# Patient Record
Sex: FEMALE | Race: BLACK OR AFRICAN AMERICAN | NOT HISPANIC OR LATINO | Employment: FULL TIME | URBAN - METROPOLITAN AREA
[De-identification: names, ages, dates, MRNs, and addresses within clinical notes are randomized per-mention and may not be internally consistent; named-entity substitution may affect disease eponyms.]

---

## 2017-10-26 ENCOUNTER — NON-PROVIDER VISIT (OUTPATIENT)
Dept: OCCUPATIONAL MEDICINE | Facility: CLINIC | Age: 47
End: 2017-10-26

## 2017-10-26 DIAGNOSIS — Z02.89 ENCOUNTER FOR OCCUPATIONAL HEALTH EXAMINATION: ICD-10-CM

## 2017-10-26 DIAGNOSIS — Z02.1 PRE-EMPLOYMENT DRUG SCREENING: ICD-10-CM

## 2017-10-26 LAB
AMP AMPHETAMINE: NORMAL
BAR BARBITURATES: NORMAL
BZO BENZODIAZEPINES: NORMAL
COC COCAINE: NORMAL
INT CON NEG: NORMAL
INT CON POS: NORMAL
MDMA ECSTASY: NORMAL
MET METHAMPHETAMINES: NORMAL
MTD METHADONE: NORMAL
OPI OPIATES: NORMAL
OXY OXYCODONE: NORMAL
PCP PHENCYCLIDINE: NORMAL
POC URINE DRUG SCREEN OCDRS: NEGATIVE
THC: NORMAL

## 2017-10-26 PROCEDURE — 80305 DRUG TEST PRSMV DIR OPT OBS: CPT | Performed by: PREVENTIVE MEDICINE

## 2018-07-05 ENCOUNTER — APPOINTMENT (OUTPATIENT)
Dept: RADIOLOGY | Facility: MEDICAL CENTER | Age: 48
End: 2018-07-05
Attending: EMERGENCY MEDICINE
Payer: MEDICAID

## 2018-07-05 ENCOUNTER — HOSPITAL ENCOUNTER (EMERGENCY)
Facility: MEDICAL CENTER | Age: 48
End: 2018-07-05
Attending: EMERGENCY MEDICINE
Payer: MEDICAID

## 2018-07-05 VITALS
OXYGEN SATURATION: 100 % | DIASTOLIC BLOOD PRESSURE: 81 MMHG | HEIGHT: 62 IN | HEART RATE: 71 BPM | TEMPERATURE: 97.9 F | RESPIRATION RATE: 16 BRPM | SYSTOLIC BLOOD PRESSURE: 115 MMHG | WEIGHT: 169.53 LBS | BODY MASS INDEX: 31.2 KG/M2

## 2018-07-05 DIAGNOSIS — D50.9 IRON DEFICIENCY ANEMIA, UNSPECIFIED IRON DEFICIENCY ANEMIA TYPE: ICD-10-CM

## 2018-07-05 DIAGNOSIS — R10.32 LEFT LOWER QUADRANT PAIN: ICD-10-CM

## 2018-07-05 DIAGNOSIS — R51.9 ACUTE NONINTRACTABLE HEADACHE, UNSPECIFIED HEADACHE TYPE: ICD-10-CM

## 2018-07-05 LAB
ALBUMIN SERPL BCP-MCNC: 4.1 G/DL (ref 3.2–4.9)
ALBUMIN/GLOB SERPL: 1.1 G/DL
ALP SERPL-CCNC: 50 U/L (ref 30–99)
ALT SERPL-CCNC: 10 U/L (ref 2–50)
ANION GAP SERPL CALC-SCNC: 5 MMOL/L (ref 0–11.9)
ANISOCYTOSIS BLD QL SMEAR: ABNORMAL
AST SERPL-CCNC: 13 U/L (ref 12–45)
BASOPHILS # BLD AUTO: 3.5 % (ref 0–1.8)
BASOPHILS # BLD: 0.15 K/UL (ref 0–0.12)
BILIRUB SERPL-MCNC: 0.3 MG/DL (ref 0.1–1.5)
BUN SERPL-MCNC: 13 MG/DL (ref 8–22)
CALCIUM SERPL-MCNC: 9.3 MG/DL (ref 8.5–10.5)
CHLORIDE SERPL-SCNC: 111 MMOL/L (ref 96–112)
CO2 SERPL-SCNC: 23 MMOL/L (ref 20–33)
CREAT SERPL-MCNC: 0.65 MG/DL (ref 0.5–1.4)
EOSINOPHIL # BLD AUTO: 0.33 K/UL (ref 0–0.51)
EOSINOPHIL NFR BLD: 7.8 % (ref 0–6.9)
ERYTHROCYTE [DISTWIDTH] IN BLOOD BY AUTOMATED COUNT: 45.7 FL (ref 35.9–50)
GLOBULIN SER CALC-MCNC: 3.6 G/DL (ref 1.9–3.5)
GLUCOSE SERPL-MCNC: 94 MG/DL (ref 65–99)
HCG SERPL QL: NEGATIVE
HCT VFR BLD AUTO: 24.6 % (ref 37–47)
HGB BLD-MCNC: 7.1 G/DL (ref 12–16)
LIPASE SERPL-CCNC: 12 U/L (ref 11–82)
LYMPHOCYTES # BLD AUTO: 1.24 K/UL (ref 1–4.8)
LYMPHOCYTES NFR BLD: 29.6 % (ref 22–41)
MANUAL DIFF BLD: NORMAL
MCH RBC QN AUTO: 20.2 PG (ref 27–33)
MCHC RBC AUTO-ENTMCNC: 28.9 G/DL (ref 33.6–35)
MCV RBC AUTO: 70.1 FL (ref 81.4–97.8)
MICROCYTES BLD QL SMEAR: ABNORMAL
MONOCYTES # BLD AUTO: 0.15 K/UL (ref 0–0.85)
MONOCYTES NFR BLD AUTO: 3.5 % (ref 0–13.4)
MORPHOLOGY BLD-IMP: NORMAL
NEUTROPHILS # BLD AUTO: 2.34 K/UL (ref 2–7.15)
NEUTROPHILS NFR BLD: 55.6 % (ref 44–72)
NRBC # BLD AUTO: 0 K/UL
NRBC BLD-RTO: 0 /100 WBC
OVALOCYTES BLD QL SMEAR: NORMAL
PLATELET # BLD AUTO: 406 K/UL (ref 164–446)
PLATELET BLD QL SMEAR: NORMAL
PMV BLD AUTO: 9.6 FL (ref 9–12.9)
POIKILOCYTOSIS BLD QL SMEAR: NORMAL
POTASSIUM SERPL-SCNC: 4 MMOL/L (ref 3.6–5.5)
PROT SERPL-MCNC: 7.7 G/DL (ref 6–8.2)
RBC # BLD AUTO: 3.51 M/UL (ref 4.2–5.4)
RBC BLD AUTO: PRESENT
SODIUM SERPL-SCNC: 139 MMOL/L (ref 135–145)
WBC # BLD AUTO: 4.2 K/UL (ref 4.8–10.8)

## 2018-07-05 PROCEDURE — 700111 HCHG RX REV CODE 636 W/ 250 OVERRIDE (IP): Performed by: EMERGENCY MEDICINE

## 2018-07-05 PROCEDURE — 36415 COLL VENOUS BLD VENIPUNCTURE: CPT

## 2018-07-05 PROCEDURE — 84703 CHORIONIC GONADOTROPIN ASSAY: CPT

## 2018-07-05 PROCEDURE — 83690 ASSAY OF LIPASE: CPT

## 2018-07-05 PROCEDURE — 76857 US EXAM PELVIC LIMITED: CPT

## 2018-07-05 PROCEDURE — 80053 COMPREHEN METABOLIC PANEL: CPT

## 2018-07-05 PROCEDURE — 74018 RADEX ABDOMEN 1 VIEW: CPT

## 2018-07-05 PROCEDURE — 85007 BL SMEAR W/DIFF WBC COUNT: CPT

## 2018-07-05 PROCEDURE — 85027 COMPLETE CBC AUTOMATED: CPT

## 2018-07-05 PROCEDURE — 96374 THER/PROPH/DIAG INJ IV PUSH: CPT

## 2018-07-05 PROCEDURE — 99285 EMERGENCY DEPT VISIT HI MDM: CPT

## 2018-07-05 RX ORDER — KETOROLAC TROMETHAMINE 30 MG/ML
30 INJECTION, SOLUTION INTRAMUSCULAR; INTRAVENOUS ONCE
Status: COMPLETED | OUTPATIENT
Start: 2018-07-05 | End: 2018-07-05

## 2018-07-05 RX ADMIN — KETOROLAC TROMETHAMINE 30 MG: 30 INJECTION, SOLUTION INTRAMUSCULAR at 12:56

## 2018-07-05 ASSESSMENT — PAIN SCALES - GENERAL: PAINLEVEL_OUTOF10: 9

## 2018-07-05 NOTE — ED PROVIDER NOTES
ED Provider Note    Scribed for Camron Resendiz M.D. by Cassandra Zabala. 7/5/2018  12:17 PM    Primary care provider: Pcp Not In Computer  Means of arrival: Walk-in  History obtained from: Patient   History limited by: none    CHIEF COMPLAINT  Chief Complaint   Patient presents with   • Head Ache     x 6 days   • Cramping     lower abd pain , hx of ovarian cyst       HPI  Valencia Robin is a 48 y.o. female who presents to the Emergency Department for a headache that began six days ago. Her headache is located in her bilateral temples and has gradually worsened. She endorses nausea and mild constipation associated. Patient has been taking Advil with mild relief of her pain.  Patient denies history of migraines. She denies head trauma. Patient was seen at [facility] for abdominal pain on 06/28/2018 and diagnosed with simple left ovarian cyst with anemia. Patient was discharged with Ultram and Iron which she has been compliant with. She went to [facility] for abdominal pain which resoled but then returned last night. She denies modifying factors of her abdominal pain. She has a follow-up appointment in two days with Dr. Oscar (OB/GYN).         REVIEW OF SYSTEMS  Pertinent positives include abdominal pain, headache, nausea. Pertinent negatives include no dysuria, hematuria, vomiting, diarrhea, fever, neck stiffness.  All other systems reviewed and negative.  C.    PAST MEDICAL HISTORY   left Ovarian cyst    SURGICAL HISTORY   has a past surgical history that includes gyn surgery.    SOCIAL HISTORY  Social History   Substance Use Topics   • Smoking status: Current Every Day Smoker     Packs/day: 0.50     Types: Cigarettes   • Smokeless tobacco: Never Used   • Alcohol use No      History   Drug Use No       FAMILY HISTORY  History reviewed. No pertinent family history.    CURRENT MEDICATIONS  Home Medications     Reviewed by Rolanda Judge R.N. (Registered Nurse) on 07/05/18 at 1122  Med List Status:  "Partial   Medication Last Dose Status   hydrocodone-acetaminophen (NORCO) 5-325 MG Tab per tablet  Active   ibuprofen (MOTRIN) 200 MG Tab 11/23/2015 Active                ALLERGIES  No Known Allergies    PHYSICAL EXAM  VITAL SIGNS: /82   Pulse 88   Temp 36.6 °C (97.9 °F)   Resp 16   Ht 1.575 m (5' 2\")   Wt 76.9 kg (169 lb 8.5 oz)   SpO2 100%   BMI 31.01 kg/m²     Constitutional: Well developed, Well nourished, mild distress, Non-toxic appearance.   HENT: Normocephalic, Atraumatic, Bilateral external ears normal, Oropharynx moist, No oral exudates.   Eyes: PERRLA, EOMI, Conjunctiva normal, No discharge.   Neck: No tenderness, Supple, No stridor. No nuchal rigidity.   Lymphatic: No lymphadenopathy noted.   Cardiovascular: Normal heart rate, Normal rhythm.   Thorax & Lungs: Clear to auscultation bilaterally, No respiratory distress, No wheezing, No crackles.   Abdomen: Soft, Mild left lower quadrant tenderness, No masses, No pulsatile masses.   Skin: Warm, Dry, No erythema, No rash.   Extremities:, No edema No cyanosis.   Musculoskeletal: No tenderness to palpation or major deformities noted.  Intact distal pulses  Neurologic: Awake, alert. Moves all extremities spontaneously.  Psychiatric: Affect normal, Judgment normal, Mood normal.     LABS  Labs Reviewed   CBC WITH DIFFERENTIAL - Abnormal; Notable for the following:        Result Value    WBC 4.2 (*)     RBC 3.51 (*)     Hemoglobin 7.1 (*)     Hematocrit 24.6 (*)     MCV 70.1 (*)     MCH 20.2 (*)     MCHC 28.9 (*)     Eosinophils 7.80 (*)     Basophils 3.50 (*)     Baso (Absolute) 0.15 (*)     All other components within normal limits   COMP METABOLIC PANEL - Abnormal; Notable for the following:     Globulin 3.6 (*)     All other components within normal limits   HCG QUAL SERUM   LIPASE   ESTIMATED GFR   DIFFERENTIAL MANUAL   PERIPHERAL SMEAR REVIEW   PLATELET ESTIMATE   MORPHOLOGY     All labs reviewed by me.    RADIOLOGY  US-PELVIS TRANSABDOMINAL " LIMITED   Final Result      Examination is limited as the patient refused the transvaginal portion of the exam.      Myometrial uterine lesions likely represent fibroids.      2.2 cm lesion along the endometrium which appears to have mass effect on the endometrium may represent a submucosal fibroid. Polyp or endometrial malignancy not excluded. Further evaluation can be obtained with MRI.      Nonvisualization of the right ovary.                     [id number] VIEW   Final Result      Mildly distended loops of small bowel in the left abdomen may represent mild ileus. Mild partial obstruction not excluded.           The radiologist's interpretation of all radiological studies have been reviewed by me.    COURSE & MEDICAL DECISION MAKING  Pertinent Labs & Imaging studies reviewed. (See chart for details)    I reviewed the patient's medical records which showed patient was seen on 06/28/2108 at [facility] [place]    12:17 PM - Patient seen and examined at bedside. Discussed with patient that I will order laboratory and radiology tests to further evaluate. Patient will be treated with 30mg toradol. Ordered CMP, Lipase, Urinalysis culture, DX-abdomen, US-GYN Pelvis transvaginal, HCG Qual Serum, and CBC to evaluate her symptoms. The differential diagnoses include but are not limited to: ovarian cyst rupture, constipation, fibroid, tension headache, nonspecific headache        Decision Making:  Patient with nonspecific headache, likely tension headache, the patient is also having left lower quadrant abdominal pain, ultrasound does not show any cysts on the ovaries, basic laboratory tests are unremarkable.  The patient will be discharged home patient has follow-up with OB/GYN in 2 days, will return with increasing redness  swelling or any other concerns    DISPOSITION:  Patient will be discharged home in stable condition.    FOLLOW UP:  [facility], Emergency Dept  [address]  [facility] [id number] [phone]    If symptoms worsen    Luc Oscar M.D.  [address]  [facility] [address]  [phone]            OUTPATIENT MEDICATIONS:  Discharge Medication List as of 7/5/2018  3:24 PM            FINAL IMPRESSION  1. Left lower quadrant pain    2. Acute nonintractable headache, unspecified headache type    3. Iron deficiency anemia, unspecified iron deficiency anemia type          I, Cassandra Zabala (Scribe), am scribing for, and in the presence of, Camron Resendiz M.D..    Electronically signed by: Cassandra Zabala (Terrie), 7/5/2018    I, Camron Resendiz M.D. personally performed the services described in this documentation, as scribed by Cassandra Zabala in my presence, and it is both accurate and complete.    The note accurately reflects work and decisions made by me.  Camron Resendiz  7/5/2018  6:43 PM

## 2018-07-05 NOTE — ED NOTES
Pt upset that room was cold and IV was painful. Pt discharged, reviewed all discharge instructions including medications and follow up, pt verbalizes understanding, and denies questions.  Escorted to lobby.

## 2018-07-05 NOTE — ED TRIAGE NOTES
Pt ambulates to triage  Chief Complaint   Patient presents with   • Head Ache     x 6 days   • Cramping     lower abd pain , hx of ovarian cyst   seen in April for abd pain  LMP 6/15  Pt asked to wait in lobby, pt updated on triage process and pt asked to inform RN of any changes.

## 2018-07-05 NOTE — DISCHARGE INSTRUCTIONS
Your hemoglobin is 7.1, take laxatives, continue with your iron, follow-up with her OB/GYN.  Return with any passing out episode lightheadedness or any other concerns.  Return with any bleeding.    Anemia, Frequently Asked Questions  WHAT ARE THE SYMPTOMS OF ANEMIA?  · Headache.   · Difficulty thinking.   · Fatigue.   · Shortness of breath.   · Weakness.   · Rapid heartbeat.   AT WHAT POINT ARE PEOPLE CONSIDERED ANEMIC?   This varies with gender and age.   · Both hemoglobin (Hgb) and hematocrit values are used to define anemia. These lab values are obtained from a complete blood count (CBC) test. This is performed at a caregiver's office.   · The normal range of hemoglobin values for adult men is 14.0 g/dL to 17.4 g/dL. For nonpregnant women, values are 12.3 g/dL to 15.3 g/dL.   · The World Health Organization defines anemia as less than 12 g/dL for nonpregnant women and less than 13 g/dL for men.   · For adult males, the average normal hematocrit is 46%, and the range is 40% to 52%.   · For adult females, the average normal hematocrit is 41%, and the range is 35% to 47%.   · Values that fall below the lower limits can be a sign of anemia and should have further checking (evaluation).   GROUPS OF PEOPLE WHO ARE AT RISK FOR DEVELOPING ANEMIA INCLUDE:   · Infants who are  or taking a formula that is not fortified with iron.   · Children going through a rapid growth spurt. The iron available can not keep up with the needs for a red cell mass which must grow with the child.   · Women in childbearing years. They need iron because of blood loss during menstruation.   · Pregnant women. The growing fetus creates a high demand for iron.   · People with ongoing gastrointestinal blood loss are at risk of developing iron deficiency.   · Individuals with leukemia or cancer who must receive chemotherapy or radiation to treat their disease. The drugs or radiation used to treat these diseases often decreases the bone  marrow's ability to make cells of all classes. This includes red blood cells, white blood cells, and platelets.   · Individuals with chronic inflammatory conditions such as rheumatoid arthritis or chronic infections.   · The elderly.   ARE SOME TYPES OF ANEMIA INHERITED?   · Yes, some types of anemia are due to inherited or genetic defects.   · Sickle cell anemia. This occurs most often in people of , , and Mediterranean descent.   · Thalassemia (or Catalan's anemia). This type is found in people of Mediterranean and [place]  descent. These types of anemia are common.   · Fanconi. This is rare.   CAN CERTAIN MEDICATIONS CAUSE A PERSON TO BECOME ANEMIC?   Yes. For example, drugs to fight cancer (chemotherapeutic agents) often cause anemia. These drugs can slow the bone marrow's ability to make red blood cells. If there are not enough red blood cells, the body does not get enough oxygen.  WHAT HEMATOCRIT LEVEL IS REQUIRED TO DONATE BLOOD?   The lower limit of an acceptable hematocrit for blood donors is 38%. If you have a low hematocrit value, you should schedule an appointment with your caregiver.  ARE BLOOD TRANSFUSIONS COMMONLY USED TO CORRECT ANEMIA, AND ARE THEY DANGEROUS?   They are used to treat anemia as a last resort. Your caregiver will find the cause of the anemia and correct it if possible. Most blood transfusions are given because of excessive bleeding at the time of surgery, with trauma, or because of bone marrow suppression in patients with cancer or leukemia on chemotherapy. Blood transfusions are safer than ever before. We also know that blood transfusions affect the immune system and may increase certain risks. There is also a concern for human error. In 1/16,000 transfusions, a patient receives a transfusion of blood that is not matched with his or her blood type.   WHAT IS IRON DEFICIENCY ANEMIA AND CAN I CORRECT IT BY CHANGING MY DIET?   Iron is an essential part of  hemoglobin. Without enough hemoglobin, anemia develops and the body does not get the right amount of oxygen. Iron deficiency anemia develops after the body has had a low level of iron for a long time. This is either caused by blood loss, not taking in or absorbing enough iron, or increased demands for iron (like pregnancy or rapid growth).   Foods from animal origin such as beef, chicken, and pork, are good sources of iron. Be sure to have one of these foods at each meal. Vitamin C helps your body absorb iron. Foods rich in Vitamin C include citrus, bell pepper, strawberries, spinach and cantaloupe. In some cases, iron supplements may be needed in order to correct the iron deficiency. In the case of poor absorption, extra iron may have to be given directly into the vein through a needle (intravenously).  I HAVE BEEN DIAGNOSED WITH IRON DEFICIENCY ANEMIA AND MY CAREGIVER PRESCRIBED IRON SUPPLEMENTS. HOW LONG WILL IT TAKE FOR MY BLOOD TO BECOME NORMAL?   It depends on the degree of anemia at the beginning of treatment. Most people with mild to moderate iron deficiency, anemia will correct the anemia over a period of 2 to 3 months. But after the anemia is corrected, the iron stored by the body is still low. Caregivers often suggest an additional 6 months of oral iron therapy once the anemia has been reversed. This will help prevent the iron deficiency anemia from quickly happening again. Non-anemic adult males should take iron supplements only under the direction of a doctor, too much iron can cause liver damage.   MY HEMOGLOBIN IS 9 G/DL AND I AM SCHEDULED FOR SURGERY. SHOULD I POSTPONE THE SURGERY?   If you have Hgb of 9, you should discuss this with your caregiver right away. Many patients with similar hemoglobin levels have had surgery without problems. If minimal blood loss is expected for a minor procedure, no treatment may be necessary.   If a greater blood loss is expected for more extensive procedures, you  should ask your caregiver about being treated with erythropoietin and iron. This is to accelerate the recovery of your hemoglobin to a normal level before surgery. An anemic patient who undergoes high-blood-loss surgery has a greater risk of surgical complications and need for a blood transfusion, which also carries some risk.   I HAVE BEEN TOLD THAT HEAVY MENSTRUAL PERIODS CAUSE ANEMIA. IS THERE ANYTHING I CAN DO TO PREVENT THE ANEMIA?   Anemia that results from heavy periods is usually due to iron deficiency. You can try to meet the increased demands for iron caused by the heavy monthly blood loss by increasing the intake of iron-rich foods. Iron supplements may be required. Discuss your concerns with your caregiver.  WHAT CAUSES ANEMIA DURING PREGNANCY?   Pregnancy places major demands on the body. The mother must meet the needs of both her body and her growing baby. The body needs enough iron and folate to make the right amount of red blood cells. To prevent anemia while pregnant, the mother should stay in close contact with her caregiver.   Be sure to eat a diet that has foods rich in iron and folate like liver and dark green leafy vegetables. Folate plays an important role in the normal development of a baby's spinal cord. Folate can help prevent serious disorders like spina bifida. If your diet does not provide adequate nutrients, you may want to talk with your caregiver about nutritional supplements.   WHAT IS THE RELATIONSHIP BETWEEN FIBROID TUMORS AND ANEMIA IN WOMEN?   The relationship is usually caused by the increased menstrual blood loss caused by fibroids. Good iron intake may be required to prevent iron deficiency anemia from developing.   Document Released: 07/26/2005 Document Revised: 03/11/2013 Document Reviewed: 01/10/2012  [employer]® Patient Information ©2013 [employer].    Pelvic Pain, Female    Headaches, Frequently Asked Questions  MIGRAINE HEADACHES  Q: What is migraine? What causes it?  "How can I treat it?  A: Generally, migraine headaches begin as a dull ache. Then they develop into a constant, throbbing, and pulsating pain. You may experience pain at the temples. You may experience pain at the front or back of one or both sides of the head. The pain is usually accompanied by a combination of:  · Nausea.   · Vomiting.   · Sensitivity to light and noise.   Some people (about 15%) experience an aura (see below) before an attack. The cause of migraine is believed to be chemical reactions in the brain. Treatment for migraine may include over-the-counter or prescription medications. It may also include self-help techniques. These include relaxation training and biofeedback.   Q: What is an aura?  A: About 15% of people with migraine get an \"aura\". This is a sign of neurological symptoms that occur before a migraine headache. You may see wavy or jagged lines, dots, or flashing lights. You might experience tunnel vision or blind spots in one or both eyes. The aura can include visual or auditory hallucinations (something imagined). It may include disruptions in smell (such as strange odors), taste or touch. Other symptoms include:  · Numbness.   · A \"pins and needles\" sensation.   · Difficulty in recalling or speaking the correct word.   These neurological events may last as long as 60 minutes. These symptoms will fade as the headache begins.  Q: What is a trigger?  A: Certain physical or environmental factors can lead to or \"trigger\" a migraine. These include:  · Foods.   · Hormonal changes.   · Weather.   · Stress.   It is important to remember that triggers are different for everyone. To help prevent migraine attacks, you need to figure out which triggers affect you. Keep a headache diary. This is a good way to track triggers. The diary will help you talk to your healthcare professional about your condition.  Q: Does weather affect migraines?  A: Bright sunshine, hot, humid conditions, and drastic " "changes in barometric pressure may lead to, or \"trigger,\" a migraine attack in some people. But studies have shown that weather does not act as a trigger for everyone with migraines.  Q: What is the link between migraine and hormones?  A: Hormones start and regulate many of your body's functions. Hormones keep your body in balance within a constantly changing environment. The levels of hormones in your body are unbalanced at times. Examples are during menstruation, pregnancy, or menopause. That can lead to a migraine attack. In fact, about three quarters of all women with migraine report that their attacks are related to the menstrual cycle.   Q: Is there an increased risk of stroke for migraine sufferers?  A: The likelihood of a migraine attack causing a stroke is very remote. That is not to say that migraine sufferers cannot have a stroke associated with their migraines. In persons under age 40, the most common associated factor for stroke is migraine headache. But over the course of a person's normal life span, the occurrence of migraine headache may actually be associated with a reduced risk of dying from cerebrovascular disease due to stroke.   Q: What are acute medications for migraine?  A: Acute medications are used to treat the pain of the headache after it has started. Examples over-the-counter medications, NSAIDs, ergots, and triptans.   Q: What are the triptans?  A: Triptans are the newest class of abortive medications. They are specifically targeted to treat migraine. Triptans are vasoconstrictors. They moderate some chemical reactions in the brain. The triptans work on receptors in your brain. Triptans help to restore the balance of a neurotransmitter called serotonin. Fluctuations in levels of serotonin are thought to be a main cause of migraine.   Q: Are over-the-counter medications for migraine effective?  A: Over-the-counter, or \"OTC,\" medications may be effective in relieving mild to moderate pain " "and associated symptoms of migraine. But you should see your caregiver before beginning any treatment regimen for migraine.   Q: What are preventive medications for migraine?  A: Preventive medications for migraine are sometimes referred to as \"prophylactic\" treatments. They are used to reduce the frequency, severity, and length of migraine attacks. Examples of preventive medications include antiepileptic medications, antidepressants, beta-blockers, calcium channel blockers, and NSAIDs (nonsteroidal anti-inflammatory drugs).  Q: Why are anticonvulsants used to treat migraine?  A: During the past few years, there has been an increased interest in antiepileptic drugs for the prevention of migraine. They are sometimes referred to as \"anticonvulsants\". Both epilepsy and migraine may be caused by similar reactions in the brain.   Q: Why are antidepressants used to treat migraine?  A: Antidepressants are typically used to treat people with depression. They may reduce migraine frequency by regulating chemical levels, such as serotonin, in the brain.   Q: What alternative therapies are used to treat migraine?  A: The term \"alternative therapies\" is often used to describe treatments considered outside the scope of conventional Western medicine. Examples of alternative therapy include acupuncture, acupressure, and yoga. Another common alternative treatment is herbal therapy. Some herbs are believed to relieve headache pain. Always discuss alternative therapies with your caregiver before proceeding. Some herbal products contain arsenic and other toxins.  TENSION HEADACHES  Q: What is a tension-type headache? What causes it? How can I treat it?  A: Tension-type headaches occur randomly. They are often the result of temporary stress, anxiety, fatigue, or anger. Symptoms include soreness in your temples, a tightening band-like sensation around your head (a \"vice-like\" ache). Symptoms can also include a pulling feeling, pressure " "sensations, and judith head and neck muscles. The headache begins in your forehead, temples, or the back of your head and neck. Treatment for tension-type headache may include over-the-counter or prescription medications. Treatment may also include self-help techniques such as relaxation training and biofeedback.  CLUSTER HEADACHES  Q: What is a cluster headache? What causes it? How can I treat it?  A: Cluster headache gets its name because the attacks come in groups. The pain arrives with little, if any, warning. It is usually on one side of the head. A tearing or bloodshot eye and a runny nose on the same side of the headache may also accompany the pain. Cluster headaches are believed to be caused by chemical reactions in the brain. They have been described as the most severe and intense of any headache type. Treatment for cluster headache includes prescription medication and oxygen.  SINUS HEADACHES  Q: What is a sinus headache? What causes it? How can I treat it?  A: When a cavity in the bones of the face and skull (a sinus) becomes inflamed, the inflammation will cause localized pain. This condition is usually the result of an allergic reaction, a tumor, or an infection. If your headache is caused by a sinus blockage, such as an infection, you will probably have a fever. An x-ray will confirm a sinus blockage. Your caregiver's treatment might include antibiotics for the infection, as well as antihistamines or decongestants.   REBOUND HEADACHES  Q: What is a rebound headache? What causes it? How can I treat it?  A: A pattern of taking acute headache medications too often can lead to a condition known as \"rebound headache.\" A pattern of taking too much headache medication includes taking it more than 2 days per week or in excessive amounts. That means more than the label or a caregiver advises. With rebound headaches, your medications not only stop relieving pain, they actually begin to cause headaches. " Doctors treat rebound headache by tapering the medication that is being overused. Sometimes your caregiver will gradually substitute a different type of treatment or medication. Stopping may be a challenge. Regularly overusing a medication increases the potential for serious side effects. Consult a caregiver if you regularly use headache medications more than 2 days per week or more than the label advises.  ADDITIONAL QUESTIONS AND ANSWERS  Q: What is biofeedback?  A: Biofeedback is a self-help treatment. Biofeedback uses special equipment to monitor your body's involuntary physical responses. Biofeedback monitors:  · Breathing.   · Pulse.   · Heart rate.   · Temperature.   · Muscle tension.   · Brain activity.   Biofeedback helps you refine and perfect your relaxation exercises. You learn to control the physical responses that are related to stress. Once the technique has been mastered, you do not need the equipment any more.  Q: Are headaches hereditary?  A: Four out of five (80%) of people that suffer report a family history of migraine. Scientists are not sure if this is genetic or a family predisposition. Despite the uncertainty, a child has a 50% chance of having migraine if one parent suffers. The child has a 75% chance if both parents suffer.   Q: Can children get headaches?  A: By the time they reach high school, most young people have experienced some type of headache. Many safe and effective approaches or medications can prevent a headache from occurring or stop it after it has begun.   Q: What type of doctor should I see to diagnose and treat my headache?  A: Start with your primary caregiver. Discuss his or her experience and approach to headaches. Discuss methods of classification, diagnosis, and treatment. Your caregiver may decide to recommend you to a headache specialist, depending upon your symptoms or other physical conditions. Having diabetes, allergies, etc., may require a more comprehensive and  inclusive approach to your headache. The [employer] Head[employer] will provide, upon request, a list of NHF physician members in your state.  Document Released: 03/09/2005 Document Revised: 03/11/2013 Document Reviewed: 08/17/2009  [employer]® Patient Information ©2013 [employer].Pelvic pain is pain felt below the belly button and between your hips. It can be caused by many different things. It is important to get help right away. This is especially true for severe, sharp, or unusual pain that comes on suddenly.   HOME CARE  · Only take medicine as told by your doctor.  · Rest as told by your doctor.  · Eat a healthy diet, such as fruits, vegetables, and lean meats.  · Drink enough fluids to keep your pee (urine) clear or pale yellow, or as told.  · Avoid sex (intercourse) if it causes pain.  · Apply warm or cold packs to your lower belly (abdomen). Use the type of pack that helps the pain.  · Avoid situations that cause you stress.  · Keep a journal to track your pain. Write down:  ¨ When the pain started.  ¨ Where it is located.  ¨ If there are things that seem to be related to the pain, such as food or your period.  · Follow up with your doctor as told.  GET HELP RIGHT AWAY IF:   · You have heavy bleeding from the vagina.  · You have more pelvic pain.  · You feel lightheaded or pass out (faint).  · You have chills.  · You have pain when you pee or have blood in your pee.  · You cannot stop having watery poop (diarrhea).  · You cannot stop throwing up (vomiting).  · You have a fever or lasting symptoms for more than 3 days.  · You have a fever and your symptoms suddenly get worse.  · You are being physically or sexually abused.  · Your medicine does not help your pain.  · You have fluid (discharge) coming from your vagina that is not normal.  MAKE SURE YOU:  · Understand these instructions.  · Will watch your condition.  · Will get help if you are not doing well or get worse.  This information is not intended  to replace advice given to you by your health care provider. Make sure you discuss any questions you have with your health care provider.  Document Released: 06/05/2009 Document Revised: 01/08/2016 Document Reviewed: 10/07/2016  [employer] Interactive Patient Education © 2017 [employer]